# Patient Record
(demographics unavailable — no encounter records)

---

## 2025-07-01 NOTE — ADDENDUM
[FreeTextEntry1] : Documented by Jim Escalera acting as a scribe for Dr. Hamida Bull on 7/1/2025. All medical record entries made by the Scribe were at my, Dr. Hamida Bull, direction and personally dictated by me on 7/1/2025. I have reviewed the chart and agree that the record accurately reflects my personal performance of the history, physical exam, assessment and plan. I have also personally directed, reviewed, and agree with the discharge instructions.

## 2025-07-01 NOTE — PHYSICAL EXAM
[de-identified] : Examination is limited to the head and neck area. The patient is a well-nourished individual who is in no acute distress and appears their stated age.     Examination reveals a well-healed paramedian forehead flap on the nose.  The cosmetic results are quite good.  The donor site on the right forehead is healed very nicely.  Just inferior to the surgical site for the flap on the right forehead there is a healing biopsy site from a recent biopsy showing basal cell cancer.  This area measures 1 cm in greatest dimension.  No other skin lesions seen at this time that are concerning for neoplastic process.   There are no palpable masses in the parotid or submandibular regions. The jugular and posterior cervical chains are devoid of palpable adenopathy. There are no palpable diseases within the central compartment.   Facial nerve function is intact. No other cranial nerve deficits are identified.   The patient is able to open their mouth fully. There is no restriction in tongue mobility. Visualization and palpation of the contents of the oral cavity shows no evidence of mucosal or submucosal pathology.   Visualization and palpation in the oropharynx show no lesion in the base of the tongue, tonsil or soft palate.

## 2025-07-01 NOTE — ASSESSMENT
[FreeTextEntry1] : Diagnosis: Eyebrow basal cell carcinoma   I had a long discussion about the clinical behavior of basal carcinomas.  I explained to the patient that these lesions do not have a significant propensity for metastasis.  However, they can be locally aggressive.  I explained to them that it is not uncommon for microscopic extension of the tumor beyond the visible extent of the disease.  For this reason, Mohs surgery is an excellent option as it can identify and address all areas with microscopic extension to optimize resection.  I told the patient that it is often difficult to fully map the full extent of the defect clinically.  As such, reconstruction will be based on the defect created after the resection. I had an in-depth discussion with the patient regarding the reconstruction options following Mohs surgery for skin cancers. The primary goal of this reconstruction is to restore both the aesthetic and functional aspects of the nose. Mohs surgery is a precise technique used to remove skin cancers, often resulting in defects that are larger than the clinically evident extent of the disease. Careful planning for reconstruction is required to optimize both functional and cosmetic outcomes. I outlined the range of reconstructive options available, which include complex wound closure, local advancement/rotation flaps, full-thickness skin grafts, and staged pedicled flap reconstructions. I explained that the choice of reconstruction depends on the precise extent and location of the defect. Since Mohs resections can be unpredictable in terms of the defect that results, it is not always possible to plan a precise reconstruction until the resection is completed. Once we know the extent of the defect, we can make a detailed plan for the reconstruction. I also discussed the general approach to the reconstruction with the patient, including the risks, benefits, and potential sequelae associated with any reconstructive procedure. These include the inherent risks of surgery, such as bleeding and infection, as well as the potential for both functional and cosmetic compromise, and the possibility of loss of flaps and grafts. The patient understood the information provided and requested to schedule the surgery. I coordinated with the Mohs surgeon to arrange the operation. I will see the patient again at the time of the surgery and sooner if any changes or issues arise.

## 2025-07-01 NOTE — HISTORY OF PRESENT ILLNESS
[de-identified] :  Patient is a 71 year old male recently diagnosed with basal cell carcinoma of the right eyebrow. Planned for Mohs surgery with Dr. Flannery on 8/25/2025 and immediate reconstruction under our care. Patient s/p paramedian forehead flap reconstruction of a nasal mohs defect on 12/24/2020. Patient was diagnosed with metastatic melanoma in 8/2020 of the back, pt states spread to liver and lung, received immunotherapy with nivolumab completed approximately February 2024.    Derm: Dr. Ramos Implants: Denies Blood Thinners: Denies  PATH 6/11//2025 Final Diagnosis: 1. Right Eyebrow; Shave Biopsy; Vectra Lesion ID: [id number]; Epic HSC ID: 1 - Basal cell carcinoma, nodular and infiltrative patterns, transected [FreeTextEntry1] : pt here for initial evaluation, follows with dermatologist Dr. Ramos every 6 months when a lesion was noted near the right eyebrow.  Current H&N Symptoms:   Bleeding - Denies Pain - Denies Trismus - Denies Dysphagia - Denies Odynophagia - Denies Voice Changes - Denies Otalgia - Denies Tinnitus - Denies Hearing loss - Denies Visual Changes - Denies Neck Mass - Denies SOB - Denies Decreased tongue mobility - Denies Facial nerve weakness/paresthesia - Denies Other - Denies

## 2025-07-01 NOTE — PHYSICAL EXAM
[de-identified] : Examination is limited to the head and neck area. The patient is a well-nourished individual who is in no acute distress and appears their stated age.     Examination reveals a well-healed paramedian forehead flap on the nose.  The cosmetic results are quite good.  The donor site on the right forehead is healed very nicely.  Just inferior to the surgical site for the flap on the right forehead there is a healing biopsy site from a recent biopsy showing basal cell cancer.  This area measures 1 cm in greatest dimension.  No other skin lesions seen at this time that are concerning for neoplastic process.   There are no palpable masses in the parotid or submandibular regions. The jugular and posterior cervical chains are devoid of palpable adenopathy. There are no palpable diseases within the central compartment.   Facial nerve function is intact. No other cranial nerve deficits are identified.   The patient is able to open their mouth fully. There is no restriction in tongue mobility. Visualization and palpation of the contents of the oral cavity shows no evidence of mucosal or submucosal pathology.   Visualization and palpation in the oropharynx show no lesion in the base of the tongue, tonsil or soft palate.

## 2025-07-01 NOTE — PHYSICAL EXAM
[de-identified] : Examination is limited to the head and neck area. The patient is a well-nourished individual who is in no acute distress and appears their stated age.     Examination reveals a well-healed paramedian forehead flap on the nose.  The cosmetic results are quite good.  The donor site on the right forehead is healed very nicely.  Just inferior to the surgical site for the flap on the right forehead there is a healing biopsy site from a recent biopsy showing basal cell cancer.  This area measures 1 cm in greatest dimension.  No other skin lesions seen at this time that are concerning for neoplastic process.   There are no palpable masses in the parotid or submandibular regions. The jugular and posterior cervical chains are devoid of palpable adenopathy. There are no palpable diseases within the central compartment.   Facial nerve function is intact. No other cranial nerve deficits are identified.   The patient is able to open their mouth fully. There is no restriction in tongue mobility. Visualization and palpation of the contents of the oral cavity shows no evidence of mucosal or submucosal pathology.   Visualization and palpation in the oropharynx show no lesion in the base of the tongue, tonsil or soft palate.

## 2025-07-01 NOTE — HISTORY OF PRESENT ILLNESS
[de-identified] :  Patient is a 71 year old male recently diagnosed with basal cell carcinoma of the right eyebrow. Planned for Mohs surgery with Dr. Flannery on 8/25/2025 and immediate reconstruction under our care. Patient s/p paramedian forehead flap reconstruction of a nasal mohs defect on 12/24/2020. Patient was diagnosed with metastatic melanoma in 8/2020 of the back, pt states spread to liver and lung, received immunotherapy with nivolumab completed approximately February 2024.    Derm: Dr. Ramos Implants: Denies Blood Thinners: Denies  PATH 6/11//2025 Final Diagnosis: 1. Right Eyebrow; Shave Biopsy; Vectra Lesion ID: [id number]; Epic HSC ID: 1 - Basal cell carcinoma, nodular and infiltrative patterns, transected [FreeTextEntry1] : pt here for initial evaluation, follows with dermatologist Dr. Ramos every 6 months when a lesion was noted near the right eyebrow.  Current H&N Symptoms:   Bleeding - Denies Pain - Denies Trismus - Denies Dysphagia - Denies Odynophagia - Denies Voice Changes - Denies Otalgia - Denies Tinnitus - Denies Hearing loss - Denies Visual Changes - Denies Neck Mass - Denies SOB - Denies Decreased tongue mobility - Denies Facial nerve weakness/paresthesia - Denies Other - Denies

## 2025-07-01 NOTE — HISTORY OF PRESENT ILLNESS
[de-identified] :  Patient is a 71 year old male recently diagnosed with basal cell carcinoma of the right eyebrow. Planned for Mohs surgery with Dr. Flannery on 8/25/2025 and immediate reconstruction under our care. Patient s/p paramedian forehead flap reconstruction of a nasal mohs defect on 12/24/2020. Patient was diagnosed with metastatic melanoma in 8/2020 of the back, pt states spread to liver and lung, received immunotherapy with nivolumab completed approximately February 2024.    Derm: Dr. Ramos Implants: Denies Blood Thinners: Denies  PATH 6/11//2025 Final Diagnosis: 1. Right Eyebrow; Shave Biopsy; Vectra Lesion ID: [id number]; Epic HSC ID: 1 - Basal cell carcinoma, nodular and infiltrative patterns, transected [FreeTextEntry1] : pt here for initial evaluation, follows with dermatologist Dr. Ramos every 6 months when a lesion was noted near the right eyebrow.  Current H&N Symptoms:   Bleeding - Denies Pain - Denies Trismus - Denies Dysphagia - Denies Odynophagia - Denies Voice Changes - Denies Otalgia - Denies Tinnitus - Denies Hearing loss - Denies Visual Changes - Denies Neck Mass - Denies SOB - Denies Decreased tongue mobility - Denies Facial nerve weakness/paresthesia - Denies Other - Denies